# Patient Record
(demographics unavailable — no encounter records)

---

## 2024-12-18 NOTE — ASSESSMENT
[FreeTextEntry1] : Pt is a 39 year old female with acute left sided back pain. Informed pt stone is unlikely source of pain because it is small and immobile.     Rx for naproxen

## 2024-12-18 NOTE — HISTORY OF PRESENT ILLNESS
[FreeTextEntry1] : 12/18/2024 cc left backside pain Pt is a 39 year old female presenting for a new patient visit with c/o back pain. Pt reports that she has been experieincing pain on her left side back for the past 4 days. Yesterday at the ER a ct scan was completed and the results showed a very small stone in her left kidney. Pt reports that the pain comes and goes, it is focused on one spot on her left back and is tender to palpation. Pt reports that laying down helps soothe the pain. Pt denies taking pain medication. Pt denies any urinary issues. Pt reports a history of kidney stones, past stones caused more pain than current experience.

## 2024-12-18 NOTE — END OF VISIT
[FreeTextEntry4] : This note was written by Yusuf Valles on 12/18/2024 actively solely Camilo Lopez M.D. I, Yusuf Valles, am scribing for and in the presence of Camilo Lopez M.D. in the following sections HISTORY OF PRESENT ILLNESS, PAST MEDICAL/FAMILY/SOCIAL HISTORY; REVIEW OF SYSTEMS; VITAL SIGNS; PHYSICAL EXAM; ASSESSMENT/PLAN. All medical record entries made by this scribe at , Camilo Lopez M.D. direction and personally dictated by me on 12/18/2024. I personally performed the services described in the documentation, reviewed the documentation recorded by the scribe in my presence, and it accurately and completely records my words and actions.